# Patient Record
Sex: MALE
[De-identification: names, ages, dates, MRNs, and addresses within clinical notes are randomized per-mention and may not be internally consistent; named-entity substitution may affect disease eponyms.]

---

## 2021-01-22 ENCOUNTER — NON-APPOINTMENT (OUTPATIENT)
Age: 61
End: 2021-01-22

## 2021-01-22 PROBLEM — Z00.00 ENCOUNTER FOR PREVENTIVE HEALTH EXAMINATION: Status: ACTIVE | Noted: 2021-01-22

## 2021-01-25 ENCOUNTER — APPOINTMENT (OUTPATIENT)
Dept: OTOLARYNGOLOGY | Facility: CLINIC | Age: 61
End: 2021-01-25
Payer: COMMERCIAL

## 2021-01-25 VITALS — HEIGHT: 66 IN | WEIGHT: 155 LBS | BODY MASS INDEX: 24.91 KG/M2 | TEMPERATURE: 98.7 F

## 2021-01-25 DIAGNOSIS — J01.41 ACUTE RECURRENT PANSINUSITIS: ICD-10-CM

## 2021-01-25 PROCEDURE — 99203 OFFICE O/P NEW LOW 30 MIN: CPT | Mod: 25

## 2021-01-25 PROCEDURE — 31231 NASAL ENDOSCOPY DX: CPT

## 2021-01-25 PROCEDURE — 99072 ADDL SUPL MATRL&STAF TM PHE: CPT

## 2021-01-25 RX ORDER — ROSUVASTATIN CALCIUM 5 MG/1
TABLET, FILM COATED ORAL
Refills: 0 | Status: ACTIVE | COMMUNITY

## 2021-01-25 RX ORDER — AMLODIPINE BESYLATE 5 MG/1
TABLET ORAL
Refills: 0 | Status: ACTIVE | COMMUNITY

## 2021-01-26 NOTE — HISTORY OF PRESENT ILLNESS
[de-identified] : Initial visit-referred in consultation.\par He reports that approximately one year ago he was evaluated by Dr. Shimon Santos in allergy consultation.\par Apparently by report he did not have a very significant response to skin testing.\par He was treated for a sinus infection at that time and topical nasal therapy was recommended. According to him the infection resolved he continued to complain of a postnasal drip and did not continue taking topical therapy.\par \par Dr. Santos recently appreciated chronic adenoiditis. He is also complaining of snoring he is not sure if he has obstructive sleep apnea syndrome.\par The hypothesis is that perhaps the adenoid tissue is causing his snoring and contributing to his chronic and recurrent sinus disease.\par \par He does report that he was evaluated by another otolaryngologist at [place] last year.\par By report the visit was very superficial and not informative.\par He was however sent for sleep study. He does not have those results.\par According to him the doctor recommended CPAP which he is not able to tolerate very well.\par \par \par His nasal complaints of bilateral.\par He does intermittently get treated for sinus infections.\par He is not using topical therapy at this time- but has been medically optimized.

## 2021-01-26 NOTE — ASSESSMENT
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 it is not obstructing at this time.\par It is conceivable that it is a nidus of infection.\par At this point he will be sent for a CT scan of the paranasal sinuses\par He will also get a copy of his sleep study for my review.\par He has apparently been medically maximized and we will determine next step was when we reviewed his scan and sleep study.\par \par I did spend a great deal of time talking to him about obstructive sleep apnea syndrome.I also talked to them about the concerns of untreated obstructive sleep apnea syndrome and the significant medical consequences which include but are not limited to the risk of cardiovascular disease, pulmonary disease, diabetes, depression and daytime fatigue.

## 2021-01-26 NOTE — PROCEDURE
[FreeTextEntry6] : PROCEDURE NOTES\par \par PROCEDURE: Nasal endoscopy \par SURGEON: Dr. Hernandez\par INDICATIONS: Assess for chronic sinusitis. \par ANESTHESIA: The patient was placed in a sitting position.  Following application of the topical anesthetic and decongestant, exam was performed with a zero degree endoscope.  The scope was passed along the right nasal floor to the nasopharynx.  It was then passed into the region of the middle meatus, middle turbinate, and sphenoethmoid region.  An identical procedure was performed on the left side.  The following findings were noted:\par \par The nasal mucosa was healthy appearing and the septum was roughly midline. The middle meatus and sphenoethmoid recesses were clear bilaterally. The nasopharynx small nonobstructing mucosal cyst\par

## 2021-02-04 ENCOUNTER — OUTPATIENT (OUTPATIENT)
Dept: OUTPATIENT SERVICES | Facility: HOSPITAL | Age: 61
LOS: 1 days | End: 2021-02-04

## 2021-02-04 ENCOUNTER — APPOINTMENT (OUTPATIENT)
Dept: CT IMAGING | Facility: CLINIC | Age: 61
End: 2021-02-04
Payer: COMMERCIAL

## 2021-02-04 PROCEDURE — 70486 CT MAXILLOFACIAL W/O DYE: CPT | Mod: 26

## 2021-02-08 ENCOUNTER — APPOINTMENT (OUTPATIENT)
Dept: OTOLARYNGOLOGY | Facility: CLINIC | Age: 61
End: 2021-02-08
Payer: COMMERCIAL

## 2021-02-08 VITALS — TEMPERATURE: 97.8 F | BODY MASS INDEX: 24.91 KG/M2 | WEIGHT: 155 LBS | HEIGHT: 66 IN

## 2021-02-08 DIAGNOSIS — R09.82 POSTNASAL DRIP: ICD-10-CM

## 2021-02-08 PROCEDURE — 99072 ADDL SUPL MATRL&STAF TM PHE: CPT

## 2021-02-08 PROCEDURE — 99214 OFFICE O/P EST MOD 30 MIN: CPT

## 2021-02-08 NOTE — HISTORY OF PRESENT ILLNESS
[de-identified] : Initial visit-referred in consultation.\par He reports that approximately one year ago he was evaluated by Dr. Shimon Santos in allergy consultation.\par Apparently by report he did not have a very significant response to skin testing.\par He was treated for a sinus infection at that time and topical nasal therapy was recommended. According to him the infection resolved he continued to complain of a postnasal drip and did not continue taking topical therapy.\par \par Dr. Santos recently appreciated chronic adenoiditis. He is also complaining of snoring he is not sure if he has obstructive sleep apnea syndrome.\par The hypothesis is that perhaps the adenoid tissue is causing his snoring and contributing to his chronic and recurrent sinus disease.\par \par He does report that he was evaluated by another otolaryngologist at [place] last year.\par By report the visit was very superficial and not informative.\par He was however sent for sleep study. He does not have those results.\par According to him the doctor recommended CPAP which he is not able to tolerate very well.\par \par \par His nasal complaints of bilateral.\par He does intermittently get treated for sinus infections.\par He is not using topical therapy at this time- but has been medically optimized. [FreeTextEntry1] : 02/08/2021 \par \par He comes in followup today.\par I reviewed his home sleep study from January of 2020.\par This demonstrated an apnea hypotony index of greater than 30.\par He had a rasheed of desaturation to 83%.\par \par He does report that a short trial of CPAP was not tolerated by him.\par \par He also had CT scan of his paranasal sinuses that I recommended.\par \par The showed moderate bilateral maxillary, ethmoid and sphenoid sinus mucosal thickening.

## 2021-02-08 NOTE — ASSESSMENT
[FreeTextEntry1] : The most relevant pathology for him is severe atrophic sleep apnea syndrome.\par Once again I reiterated potential risks of untreated severe apnea which include but are not limited to risks of cardiovascular disease, pulmonary disease, depression number diabetes as well as car accidents.\par \par While he does have sinus mucosal disease and mild hypertrophy his nasopharynx tissue, I do not believe that is what was driving his main pathology.\par It certainly could be contributing to his postnasal drip and history of recurrent sinus infections.\par \par In the short term I will refer for workup for a possible oral appliance for obstructive sleep apnea syndrome.\par I explained to him he would likely need a post appliance sleep study which I would be happy to set up for him.\par \par We talked about the utility of image guided endoscopic sinus surgery, septoplasty, turbinate reduction and adenoidectomy. We will defer this at this point.\par \par He may also consider another trial of CPAP.\par \par I spent greater than 30 minutes on the care of this patient today.

## 2021-02-08 NOTE — CONSULT LETTER
[Dear  ___] : Dear  [unfilled], [Courtesy Letter:] : I had the pleasure of seeing your patient, [unfilled], in my office today. [Please see my note below.] : Please see my note below. [Sincerely,] : Sincerely, [FreeTextEntry3] : Hal Hernandez MD\par [facility]- Co-Director\par [facility] Physician [facility]  [FreeTextEntry1] : Enclosed- ct scan.

## 2023-04-03 ENCOUNTER — APPOINTMENT (OUTPATIENT)
Dept: OTOLARYNGOLOGY | Facility: CLINIC | Age: 63
End: 2023-04-03
Payer: COMMERCIAL

## 2023-04-03 DIAGNOSIS — Z87.891 PERSONAL HISTORY OF NICOTINE DEPENDENCE: ICD-10-CM

## 2023-04-03 DIAGNOSIS — J01.41 ACUTE RECURRENT PANSINUSITIS: ICD-10-CM

## 2023-04-03 DIAGNOSIS — Z72.89 OTHER PROBLEMS RELATED TO LIFESTYLE: ICD-10-CM

## 2023-04-03 DIAGNOSIS — Z78.9 OTHER SPECIFIED HEALTH STATUS: ICD-10-CM

## 2023-04-03 PROCEDURE — 31575 DIAGNOSTIC LARYNGOSCOPY: CPT

## 2023-04-03 PROCEDURE — 99213 OFFICE O/P EST LOW 20 MIN: CPT | Mod: 25

## 2023-04-04 NOTE — ASSESSMENT
[FreeTextEntry1] : 1–obstructive sleep apnea syndrome.  I am going to send him for a formal sleep work-up which includes a decision by the sleep medicine doctor to consider an in-lab titration study versus a home level 2 study.\par We touched on some of the consequences of untreated obstructive sleep apnea syndrome which include but are not limited to the risks of cardiovascular disease, diabetes, to pressure and significant fatigue.\par \par 2–acute pansinusitis.  Will recommend supportive care at this time.\par We will follow-up his culture to decide if culture directed therapy may be beneficial.

## 2023-04-04 NOTE — HISTORY OF PRESENT ILLNESS
[de-identified] : I have not evaluated this gentleman in nearly 2 years.\par His chief complaint today is "sleep apnea".\par He had a home sleep study several years ago–today I reviewed the results.  He had an apnea-hypopnea index of 30 making him consistent with severe obstructive sleep apnea.  For short period of time he did use CPAP but was not able to tolerate it secondary to the discomfort.\par At this point he feels that his snoring and witnessed apneic events are getting worse.\par \par He also has a history of sinonasal disease.\par In the last 7 to 10 days he has worsening sinonasal symptoms.\par I reviewed a previous CT scan which showed mild to moderate sinus mucosal disease.

## 2023-04-04 NOTE — PROCEDURE
[FreeTextEntry6] : PROCEDURE NOTES\par \par PROCEDURE: Nasal endoscopy \par SURGEON: Dr. Hernandez\par INDICATIONS: Assess for acute pansinusitis. \par ANESTHESIA: The patient was placed in a sitting position.  Following application of the topical anesthetic and decongestant, exam was performed with a zero degree endoscope.  The scope was passed along the right nasal floor to the nasopharynx.  It was then passed into the region of the middle meatus, middle turbinate, and sphenoethmoid region.  An identical procedure was performed on the left side.  The following findings were noted:\par \par The nasal mucosa was healthy appearing and the septum was roughly midline. The middle meatus and sphenoethmoid recesses were clear bilaterally. The Superior meatus was without lesion or infection.  The Inferior, Middle and Superior Turbinates were not enlarged and healthy in appearance.  There was not pus, polyps or mass.  The nasopharynx was normal. \par \par \par Culture was taken of his left middle meatus which was filled with pus. [de-identified] : PROCEDURE: Flexible laryngoscopy\par SURGEON: Dr. Hernandez\par INDICATIONS: He was unable to tolerate a mirror exam. Assess for laryngopharynx pharyngeal reflux. cough. head and neck mass. \par ANESTHESIA: The patient was placed in a sitting position.  Following application of the topical anesthetic and decongestant, exam was performed with a flexible scope.  The scope was passed along the right nasal floor to the nasopharynx.  It was then passed into the region of the middle meatus, middle turbinate, and sphenoethmoid region.  An identical procedure was performed on the left side.  The following findings were noted:\par \par The nasal musoca was healthy appearing and the septum was roughly midline. The middle meatus and sphenoethmoid recesses were clear bilaterally. The nasopharynx \par \par Nasopharynx: no masses, choanae patent, no adenoid tissue\par \par Base of tongue/vallecula: no masses or asymmetry\par Pharyngeal walls: symmetrical. No masses.\par Pyriform sinuses: no lesions or pooling of secretions.\par Epiglottis: normal. No edema or lesions.\par Aryepiglottic folds: normal. No lesions. \par Vocal cords: clear and mobile. No lesions. Airway patent.\par Arytenoids: no edema or erythema. \par Interarytenoid area: no edema, erythema or lesion.\par

## 2023-04-04 NOTE — CONSULT LETTER
[Dear  ___] : Dear  [unfilled], [Courtesy Letter:] : I had the pleasure of seeing your patient, [unfilled], in my office today. [Please see my note below.] : Please see my note below. [Consult Closing:] : Thank you very much for allowing me to participate in the care of this patient.  If you have any questions, please do not hesitate to contact me. [Sincerely,] : Sincerely, [FreeTextEntry3] : Hal Hernandez MD\par [facility]- Co-Director\par [facility] Physician [facility]

## 2023-04-10 LAB — EAR NOSE AND THROAT CULTURE: ABNORMAL

## 2023-05-23 ENCOUNTER — OUTPATIENT (OUTPATIENT)
Dept: OUTPATIENT SERVICES | Facility: HOSPITAL | Age: 63
LOS: 1 days | End: 2023-05-23
Payer: COMMERCIAL

## 2023-05-23 ENCOUNTER — APPOINTMENT (OUTPATIENT)
Dept: SLEEP CENTER | Facility: HOME HEALTH | Age: 63
End: 2023-05-23
Payer: COMMERCIAL

## 2023-05-23 PROCEDURE — 95800 SLP STDY UNATTENDED: CPT | Mod: 26

## 2023-05-23 PROCEDURE — 95800 SLP STDY UNATTENDED: CPT

## 2023-05-24 DIAGNOSIS — G47.33 OBSTRUCTIVE SLEEP APNEA (ADULT) (PEDIATRIC): ICD-10-CM

## 2023-06-29 ENCOUNTER — APPOINTMENT (OUTPATIENT)
Dept: OTOLARYNGOLOGY | Facility: CLINIC | Age: 63
End: 2023-06-29
Payer: COMMERCIAL

## 2023-06-29 VITALS — WEIGHT: 153 LBS | HEIGHT: 66 IN | BODY MASS INDEX: 24.59 KG/M2

## 2023-06-29 PROCEDURE — 99213 OFFICE O/P EST LOW 20 MIN: CPT

## 2023-06-29 RX ORDER — AMOXICILLIN AND CLAVULANATE POTASSIUM 875; 125 MG/1; MG/1
875-125 TABLET, COATED ORAL
Qty: 20 | Refills: 1 | Status: COMPLETED | COMMUNITY
Start: 2023-04-04 | End: 2023-06-29

## 2023-06-29 RX ORDER — CIPROFLOXACIN HYDROCHLORIDE 500 MG/1
500 TABLET, FILM COATED ORAL TWICE DAILY
Qty: 20 | Refills: 0 | Status: COMPLETED | COMMUNITY
Start: 2023-04-06 | End: 2023-06-29

## 2023-06-29 NOTE — HISTORY OF PRESENT ILLNESS
[de-identified] : I have not evaluated this gentleman in nearly 2 years.\par His chief complaint today is "sleep apnea".\par He had a home sleep study several years ago–today I reviewed the results.  He had an apnea-hypopnea index of 30 making him consistent with severe obstructive sleep apnea.  For short period of time he did use CPAP but was not able to tolerate it secondary to the discomfort.\par At this point he feels that his snoring and witnessed apneic events are getting worse.\par \par He also has a history of sinonasal disease.\par In the last 7 to 10 days he has worsening sinonasal symptoms.\par I reviewed a previous CT scan which showed mild to moderate sinus mucosal disease. [FreeTextEntry1] : \par \par 06/29/2023 \par \par Comes in in follow-up.  He had a sleep study.\par This demonstrated an apnea-hypopnea index of 23.3 with a rasheed desaturation to 84%.

## 2023-06-29 NOTE — ASSESSMENT
[FreeTextEntry1] : Moderate to severe obstructive sleep apnea syndrome.\par We discussed the risks of untreated apnea which include but are not limited to the risks of cardiovascular disease, diabetes, depression, car accidents.\par Fortunately is not having daytime fatigue.\par I will refer him to Dr. Sharda Deal for another trial of CPAP.  He reports that he used CPAP 5 years ago and was not able to tolerate it.\par I have also given him information to consider an oral appliance.
none

## 2023-10-24 ENCOUNTER — APPOINTMENT (OUTPATIENT)
Dept: PULMONOLOGY | Facility: CLINIC | Age: 63
End: 2023-10-24

## 2024-04-22 ENCOUNTER — APPOINTMENT (OUTPATIENT)
Dept: PULMONOLOGY | Facility: CLINIC | Age: 64
End: 2024-04-22
Payer: COMMERCIAL

## 2024-04-22 VITALS
HEIGHT: 66 IN | BODY MASS INDEX: 24.91 KG/M2 | DIASTOLIC BLOOD PRESSURE: 89 MMHG | HEART RATE: 111 BPM | WEIGHT: 155 LBS | TEMPERATURE: 97.5 F | SYSTOLIC BLOOD PRESSURE: 166 MMHG | OXYGEN SATURATION: 99 %

## 2024-04-22 DIAGNOSIS — G47.33 OBSTRUCTIVE SLEEP APNEA (ADULT) (PEDIATRIC): ICD-10-CM

## 2024-04-22 PROCEDURE — 99204 OFFICE O/P NEW MOD 45 MIN: CPT

## 2024-04-22 NOTE — HISTORY OF PRESENT ILLNESS
[FreeTextEntry1] : The patient is a 63-year-old M, former smoker, with PMHx severe MERCEDES, referred by Dr. Hernandez for management of MERCEDES. Reports using CPAP 5 years ago but was not able to tolerate it. Feels snoring and witnessed apneic events are getting worse. Had HST.  [ESS] : 3

## 2024-04-22 NOTE — REVIEW OF SYSTEMS
[Difficulty Initiating Sleep] : no difficulty falling asleep [Unusual Sleep Behavior] : no unusual sleep behavior [Negative] : Gastrointestinal

## 2024-04-22 NOTE — ASSESSMENT
[FreeTextEntry1] : REVIEWED HST 5/23/23: AHI 30.7 (3%); AHI 23.23 (4%); T88 1.5 minutes (0.4%)  HST c/w moderate to severe MERCEDES. Indicated for treatment. First line treatment option is PAP therapy. Will start with APAP, set 5 to 20 cm H2O with a home mask fitting. DME is [facility]. The benefits of MERCEDES treatment in improving cardiac, neurologic, cognitive, and mortality outcomes were discussed. Adherence goal is at least 4 hours per night on 70% of nights with an AHI of less than 10 events per hour. The ramifications of MERCEDES and its treatment were discussed in detail. Follow up within 10 weeks of use.

## 2024-04-22 NOTE — PHYSICAL EXAM
[General Appearance - Well Developed] : well developed [General Appearance - Well Nourished] : well nourished [Normal Conjunctiva] : the conjunctiva exhibited no abnormalities [Neck Appearance] : the appearance of the neck was normal [] : no respiratory distress [Exaggerated Use Of Accessory Muscles For Inspiration] : no accessory muscle use [Abnormal Walk] : normal gait [Oriented To Time, Place, And Person] : oriented to person, place, and time [Impaired Insight] : insight and judgment were intact

## 2024-04-22 NOTE — CONSULT LETTER
[Dear  ___] : Dear  [unfilled], [Consult Letter:] : I had the pleasure of evaluating your patient, [unfilled]. [Please see my note below.] : Please see my note below. [Consult Closing:] : Thank you very much for allowing me to participate in the care of this patient.  If you have any questions, please do not hesitate to contact me. [Sincerely,] : Sincerely, [FreeTextEntry3] : Sharda Espinal MD  [employer] at [facility] Pulmonary, Critical Care, and Sleep Medicine [DrJoanne  ___] : Dr. VIZCARRA

## 2024-07-16 ENCOUNTER — APPOINTMENT (OUTPATIENT)
Dept: PULMONOLOGY | Facility: CLINIC | Age: 64
End: 2024-07-16